# Patient Record
Sex: MALE | URBAN - NONMETROPOLITAN AREA
[De-identification: names, ages, dates, MRNs, and addresses within clinical notes are randomized per-mention and may not be internally consistent; named-entity substitution may affect disease eponyms.]

---

## 2022-09-29 ENCOUNTER — APPOINTMENT (OUTPATIENT)
Age: 22
Setting detail: DERMATOLOGY
End: 2022-09-30

## 2022-09-29 DIAGNOSIS — B35.4 TINEA CORPORIS: ICD-10-CM

## 2022-09-29 PROCEDURE — OTHER MIPS QUALITY: OTHER

## 2022-09-29 PROCEDURE — OTHER PRESCRIPTION MEDICATION MANAGEMENT: OTHER

## 2022-09-29 PROCEDURE — 99203 OFFICE O/P NEW LOW 30 MIN: CPT

## 2022-09-29 PROCEDURE — OTHER PRESCRIPTION: OTHER

## 2022-09-29 RX ORDER — FLUCONAZOLE 150 MG/1
150 MG TABLET ORAL
Qty: 4 | Refills: 0 | Status: ERX | COMMUNITY
Start: 2022-09-29

## 2022-09-29 RX ORDER — KETOCONAZOLE 20 MG/ML
2% SHAMPOO, SUSPENSION TOPICAL DAILY
Qty: 240 | Refills: 4 | Status: ERX | COMMUNITY
Start: 2022-09-29

## 2022-09-29 ASSESSMENT — BSA RASH: BSA RASH: 20

## 2022-09-29 ASSESSMENT — LOCATION SIMPLE DESCRIPTION DERM
LOCATION SIMPLE: RIGHT UPPER BACK
LOCATION SIMPLE: LEFT UPPER BACK

## 2022-09-29 ASSESSMENT — LOCATION DETAILED DESCRIPTION DERM
LOCATION DETAILED: LEFT SUPERIOR UPPER BACK
LOCATION DETAILED: LEFT SUPERIOR LATERAL UPPER BACK
LOCATION DETAILED: RIGHT SUPERIOR LATERAL UPPER BACK

## 2022-09-29 ASSESSMENT — LOCATION ZONE DERM: LOCATION ZONE: TRUNK

## 2022-09-29 NOTE — PROCEDURE: PRESCRIPTION MEDICATION MANAGEMENT
Render In Strict Bullet Format?: No
Continue Regimen: Clotrimazole cream as directed by PCP
Detail Level: Zone